# Patient Record
Sex: FEMALE | URBAN - METROPOLITAN AREA
[De-identification: names, ages, dates, MRNs, and addresses within clinical notes are randomized per-mention and may not be internally consistent; named-entity substitution may affect disease eponyms.]

---

## 2017-08-22 PROBLEM — Z12.11 SCREENING FOR COLON CANCER: Status: ACTIVE | Noted: 2017-08-22

## 2021-12-01 ENCOUNTER — MED REC SCAN ONLY (OUTPATIENT)
Dept: ORTHOPEDICS CLINIC | Facility: CLINIC | Age: 66
End: 2021-12-01

## 2022-03-21 ENCOUNTER — TELEPHONE (OUTPATIENT)
Dept: ORTHOPEDICS CLINIC | Facility: CLINIC | Age: 67
End: 2022-03-21

## 2022-03-21 NOTE — TELEPHONE ENCOUNTER
Patient is coming in for left ankle pain. Patient had imaging done,patient will bring imaging from external facility ,if further imaging is needed please place Rx .   Future Appointments   Date Time Provider Aakash Ware   4/13/2022  1:40 PM Xi Solorzano MD [facility]

## 2022-04-13 ENCOUNTER — OFFICE VISIT (OUTPATIENT)
Dept: ORTHOPEDICS CLINIC | Facility: CLINIC | Age: 67
End: 2022-04-13
Payer: COMMERCIAL

## 2022-04-13 VITALS — BODY MASS INDEX: 29.44 KG/M2 | WEIGHT: 160 LBS | HEIGHT: 62 IN

## 2022-04-13 DIAGNOSIS — M65.9 TENOSYNOVITIS OF TIBIALIS POSTERIOR TENDON: ICD-10-CM

## 2022-04-13 DIAGNOSIS — M25.572 ACUTE LEFT ANKLE PAIN: Primary | ICD-10-CM

## 2022-04-13 DIAGNOSIS — M65.9 TENOSYNOVITIS OF LEFT FOOT: ICD-10-CM

## 2022-04-13 PROCEDURE — 3008F BODY MASS INDEX DOCD: CPT | Performed by: ORTHOPAEDIC SURGERY

## 2022-04-13 PROCEDURE — 20551 NJX 1 TENDON ORIGIN/INSJ: CPT | Performed by: ORTHOPAEDIC SURGERY

## 2022-04-13 PROCEDURE — 99213 OFFICE O/P EST LOW 20 MIN: CPT | Performed by: ORTHOPAEDIC SURGERY

## 2022-04-13 RX ORDER — TRIAMCINOLONE ACETONIDE 40 MG/ML
20 INJECTION, SUSPENSION INTRA-ARTICULAR; INTRAMUSCULAR ONCE
Status: COMPLETED | OUTPATIENT
Start: 2022-04-13 | End: 2022-04-13

## 2022-04-13 RX ORDER — ESTRADIOL 0.05 MG/D
1 FILM, EXTENDED RELEASE TRANSDERMAL
COMMUNITY
Start: 2021-09-01

## 2022-04-13 RX ORDER — PROGESTERONE 100 MG/1
CAPSULE ORAL
COMMUNITY
Start: 2022-04-09

## 2022-04-13 RX ORDER — LEVOTHYROXINE SODIUM 0.1 MG/1
1 TABLET ORAL EVERY MORNING
COMMUNITY
Start: 2021-07-19

## 2022-04-13 RX ORDER — PYRIDOXINE HCL (VITAMIN B6) 100 MG
100 TABLET ORAL DAILY
COMMUNITY

## 2022-04-13 RX ORDER — ZINC GLUCONATE 50 MG
TABLET ORAL AS DIRECTED
COMMUNITY

## 2022-04-13 RX ADMIN — TRIAMCINOLONE ACETONIDE 20 MG: 40 INJECTION, SUSPENSION INTRA-ARTICULAR; INTRAMUSCULAR at 14:26:00

## 2022-04-13 NOTE — PROGRESS NOTES
Patient is 62-year-old white female with persistent left foot and ankle pain. This is been going on for about 7 8 months. She is seen another doctor who had suggested surgery and she was uncomfortable with this and presents to our office today for evaluation. Patient states it first began when she was using a spade and digging in her garden and she felt a sharp pain on the plantar aspect of her foot. She had been treated with oral medications including prednisone which did not help her and then she had nonweightbearing and in crutches for 6 weeks which did not help and then she has been in a boot and other devices to try to alleviate her symptoms. Patient comes in today looking for help and direction on her treatment. Patient denies any problem with her foot prior to the onset of this. Patient states the pain is predominately on the plantar aspect of her foot. Patient's exam shows her to have a mildly antalgic gait. Patient has tenderness in the midfoot region beneath the first talonavicular and naviculocuneiform joints. Patient also has some tenderness in the plantar soft tissues beneath these joints. No palpable deformities. Patient has intact inversion of the ankle and also intact flexion of the great toe and the lesser toe. Patient sensation is intact in the lower extremities. Motor exam is grossly 5/5. X-rays are reviewed as well as an MRI scan of her ankle and these show her to have some inflammatory changes along the medial aspect of the ankle but also in the plantar aspect of the foot. These are from back last year. Plain x-rays show arch to be well-maintained. Minimal degenerative changes noted. Impression is that of tenosynovitis of the flexor tendons to the greater and lesser toes of the left foot. Second impression is that of synovitis of the naviculocuneiform joint. Recommendations are to go ahead with a cortisone injection which was done into the navicular cuneiform joint. Patient had some increased pain at the initial injection. This resolved without problems after a few minutes. Patient's gait after that was slightly improved but still having some pain. In light of this I recommended that we go ahead with a MRI scan of her foot which is where her pathology seems to be focused. We will reevaluate after that is completed. Might consider an injection in the tendon sheath of the flexor tendons to the toes. This might be an issue with the irritation of the master knot of [place].

## 2022-04-18 ENCOUNTER — MED REC SCAN ONLY (OUTPATIENT)
Dept: ORTHOPEDICS CLINIC | Facility: CLINIC | Age: 67
End: 2022-04-18

## 2022-04-25 ENCOUNTER — TELEPHONE (OUTPATIENT)
Dept: ORTHOPEDICS CLINIC | Facility: CLINIC | Age: 67
End: 2022-04-25

## 2022-04-25 NOTE — TELEPHONE ENCOUNTER
Patient called and has been waiting for Dr. Donnie Walton to call and go over MRI results.  Please advise

## 2022-04-25 NOTE — TELEPHONE ENCOUNTER
MRI results: No imaging in epic. LM for pt to call back and schedule an appt to review MRI results with Dr Angelic Tovar and to let us know where MRI was completed. Pt is to bring MRI disc to her appt. No future appointments.

## 2022-05-11 ENCOUNTER — OFFICE VISIT (OUTPATIENT)
Dept: ORTHOPEDICS CLINIC | Facility: CLINIC | Age: 67
End: 2022-05-11
Payer: COMMERCIAL

## 2022-05-11 VITALS — HEIGHT: 62 IN | BODY MASS INDEX: 30.91 KG/M2 | WEIGHT: 168 LBS

## 2022-05-11 DIAGNOSIS — M65.9 TENOSYNOVITIS OF LEFT FOOT: Primary | ICD-10-CM

## 2022-05-11 PROCEDURE — 3008F BODY MASS INDEX DOCD: CPT | Performed by: ORTHOPAEDIC SURGERY

## 2022-05-11 PROCEDURE — 99212 OFFICE O/P EST SF 10 MIN: CPT | Performed by: ORTHOPAEDIC SURGERY

## 2022-05-21 NOTE — PROGRESS NOTES
Patient is a 59-year-old white female here for follow-up on her left ankle. Patient had the injection of her left foot and the navicular cuneiform joint which did give her significant relief of pain. Patient states that she is doing much better than she was when she came in last visit. The patient did have an MRI scan ordered of her left foot due to the fact that she was still having quite a bit of pain when she left after her last visit. I did review the MRI with her and it shows her to have pretty much a fairly healthy foot. Patient's exam shows that have a nonantalgic gait. No tenderness over the left foot midfoot region. Range of motion of the ankle and the toes is intact. Motor exam grossly 5/5. Impression is that of resolving tenosynovitis and synovitis of the left foot. Recommendations are for her to continue to wear good shoes with good arch supports. Follow-up will be as needed at this time. If symptoms return we can always try repeating the cortisone injection.

## 2023-02-01 ENCOUNTER — HOSPITAL ENCOUNTER (OUTPATIENT)
Dept: GENERAL RADIOLOGY | Age: 68
Discharge: HOME | End: 2023-02-01
Attending: ORTHOPAEDIC SURGERY
Payer: MEDICARE

## 2023-02-01 ENCOUNTER — OFFICE VISIT (OUTPATIENT)
Dept: ORTHOPEDICS CLINIC | Facility: CLINIC | Age: 68
End: 2023-02-01
Payer: MEDICARE

## 2023-02-01 VITALS — BODY MASS INDEX: 30.91 KG/M2 | HEIGHT: 62 IN | WEIGHT: 168 LBS

## 2023-02-01 DIAGNOSIS — G89.29 PAIN, FOOT, CHRONIC, LEFT: Primary | ICD-10-CM

## 2023-02-01 DIAGNOSIS — G89.29 PAIN, FOOT, CHRONIC, RIGHT: ICD-10-CM

## 2023-02-01 DIAGNOSIS — M79.671 PAIN, FOOT, CHRONIC, RIGHT: ICD-10-CM

## 2023-02-01 DIAGNOSIS — M79.673 PAIN OF FOOT, UNSPECIFIED LATERALITY: ICD-10-CM

## 2023-02-01 DIAGNOSIS — M79.672 PAIN, FOOT, CHRONIC, LEFT: Primary | ICD-10-CM

## 2023-02-01 DIAGNOSIS — M79.673 CHRONIC FOOT PAIN: ICD-10-CM

## 2023-02-01 DIAGNOSIS — M76.822 POSTERIOR TIBIAL TENDON DYSFUNCTION (PTTD) OF LEFT LOWER EXTREMITY: ICD-10-CM

## 2023-02-01 DIAGNOSIS — G89.29 CHRONIC FOOT PAIN: ICD-10-CM

## 2023-02-01 PROCEDURE — 99214 OFFICE O/P EST MOD 30 MIN: CPT | Performed by: ORTHOPAEDIC SURGERY

## 2023-02-01 PROCEDURE — 73630 X-RAY EXAM OF FOOT: CPT | Performed by: ORTHOPAEDIC SURGERY

## 2023-02-01 RX ORDER — LEVOTHYROXINE SODIUM 100 UG/1
CAPSULE ORAL
COMMUNITY
Start: 2023-01-29

## 2023-02-01 NOTE — PROGRESS NOTES
Patient is a 26-year-old white female who have seen in the past for problems with her left ankle and foot is here for follow-up because of persistent pain. Patient states that she actually has the pain of both feet. Patient does note that it is better than it was in the past but that is because she is doing very little activity. Patient states that if she increases her walking the pain returns. Previous MRI scan showed very little pathology to account for the pain of the left foot. Patient's exam shows that have a nonantalgic gait. Exam of her feet shows her to have tenderness along the medial border of the foot at the level of the navicular and cuneiform. This is of both feet. No palpable deformities. Patient has active inversion of the feet bilaterally. The patient's arches are fairly well-maintained with weightbearing. Sensory exams intact light touch lower extremities bilaterally. Dorsalis pedis pulses intact. X-rays of the feet show her to have minimal degenerative changes of the foot. Minimal degenerative changes in the midfoot. No significant osteophytes. Patient does have retained hardware of the right foot first and second rays as well as a small cerclage wire of the left foot in the first distal phalanx. All joints however have minimal to no arthritis. Impression is that of possible posterior tibialis tendon wearing and tendinitis of the feet bilaterally. 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 In light of the absence of x-ray findings I advise going ahead with an MRI scan of the left foot which is the most symptomatic foot. We will follow-up with her after that is completed. Patient has had improvement from previous injections into the navicular cuneiform joint. Would consider doing that again.

## 2023-02-10 ENCOUNTER — HOSPITAL ENCOUNTER (OUTPATIENT)
Dept: MRI IMAGING | Age: 68
Discharge: HOME | End: 2023-02-10
Attending: ORTHOPAEDIC SURGERY
Payer: MEDICARE

## 2023-02-10 DIAGNOSIS — G89.29 PAIN, FOOT, CHRONIC, LEFT: ICD-10-CM

## 2023-02-10 DIAGNOSIS — M79.672 PAIN, FOOT, CHRONIC, LEFT: ICD-10-CM

## 2023-02-10 DIAGNOSIS — M76.822 POSTERIOR TIBIAL TENDON DYSFUNCTION (PTTD) OF LEFT LOWER EXTREMITY: ICD-10-CM

## 2023-02-10 PROCEDURE — 73718 MRI LOWER EXTREMITY W/O DYE: CPT | Performed by: ORTHOPAEDIC SURGERY

## 2023-02-22 ENCOUNTER — OFFICE VISIT (OUTPATIENT)
Dept: ORTHOPEDICS CLINIC | Facility: CLINIC | Age: 68
End: 2023-02-22
Payer: MEDICARE

## 2023-02-22 VITALS — WEIGHT: 168 LBS | HEIGHT: 62 IN | BODY MASS INDEX: 30.91 KG/M2

## 2023-02-22 DIAGNOSIS — M25.572 ACUTE LEFT ANKLE PAIN: ICD-10-CM

## 2023-02-22 DIAGNOSIS — M65.9 TENOSYNOVITIS OF LEFT FOOT: Primary | ICD-10-CM

## 2023-02-22 RX ORDER — MEDROXYPROGESTERONE ACETATE 10 MG/1
10 TABLET ORAL DAILY
COMMUNITY
Start: 2023-02-14

## 2023-02-22 RX ORDER — ESTRADIOL 0.04 MG/D
FILM, EXTENDED RELEASE TRANSDERMAL
COMMUNITY
Start: 2023-02-14

## 2023-02-23 RX ORDER — TRIAMCINOLONE ACETONIDE 40 MG/ML
20 INJECTION, SUSPENSION INTRA-ARTICULAR; INTRAMUSCULAR ONCE
Status: SHIPPED | OUTPATIENT
Start: 2023-02-23

## 2023-02-23 NOTE — PROGRESS NOTES
Patient is a 63-year-old female here for follow-up on her left ankle. Patient has had an MRI scan performed recently which shows her to have some inflammatory changes around the posterior tibialis tendon. He she also has some changes around the peroneal tendons with a split peroneus brevis tendon. Patient's complaints however almost entirely on the medial aspect of the ankle and have an associated swelling. Patient has had previous injections of her right ankle which have helped her substantially in the past.  No recent trauma noted. Patient's exam shows have mild to moderate swelling to the medial aspect of the left ankle. She has tenderness over the posterior tibialis tendon at the medial malleolus. She has good strength of inversion and eversion of the ankle. No tenderness over the plantar fascia. No tenderness laterally over the peroneal tendons. Minimal effusion of the ankle joint itself. Neurologically intact light touch both feet. Dorsalis pedis pulses intact. Impression is that of posterior tibialis tenosynovitis of the left ankle. Recommendation is to go ahead with a cortisone injection in the tendon sheath just behind the medial malleolus. This was done with 1 cc of Xylocaine and Marcaine and 20 mg of Kenalog. Patient tolerated the injection well. We will have her follow-up with me in a month or 2. If this alleviates her symptoms no other treatments necessary.

## 2023-07-31 ENCOUNTER — TELEPHONE (OUTPATIENT)
Dept: ORTHOPEDICS CLINIC | Facility: CLINIC | Age: 68
End: 2023-07-31

## 2023-07-31 DIAGNOSIS — M25.512 LEFT SHOULDER PAIN, UNSPECIFIED CHRONICITY: Primary | ICD-10-CM

## 2023-07-31 NOTE — TELEPHONE ENCOUNTER
Would you be ok to see pt for RTC injury or would you prefer he be seen with someone else? You had previously seen her for ankle pain.

## 2023-07-31 NOTE — TELEPHONE ENCOUNTER
Previous patient of Orquidea Garcia wants to be seen for a new issue due to a Left shoulder RC injury. Please advise If okay to schedule or referred out to other providers.

## 2023-08-02 NOTE — TELEPHONE ENCOUNTER
Okay to schedule with DR Donnie Walton, per Davi Santana. Xray ordered. Please schedule with pt. Thank you!

## 2023-08-02 NOTE — TELEPHONE ENCOUNTER
Done.        Future Appointments   Date Time Provider Aakash Jeanie   8/11/2023 10:30 AM [facility] [place]   8/11/2023 10:40 AM Stefanie Smiley MD [facility]

## 2023-08-11 ENCOUNTER — HOSPITAL ENCOUNTER (OUTPATIENT)
Dept: GENERAL RADIOLOGY | Age: 68
Discharge: HOME | End: 2023-08-11
Attending: ORTHOPAEDIC SURGERY
Payer: MEDICARE

## 2023-08-11 ENCOUNTER — OFFICE VISIT (OUTPATIENT)
Dept: ORTHOPEDICS CLINIC | Facility: CLINIC | Age: 68
End: 2023-08-11
Payer: MEDICARE

## 2023-08-11 VITALS — HEIGHT: 62 IN | WEIGHT: 163 LBS | BODY MASS INDEX: 30 KG/M2

## 2023-08-11 DIAGNOSIS — M25.512 LEFT SHOULDER PAIN, UNSPECIFIED CHRONICITY: ICD-10-CM

## 2023-08-11 DIAGNOSIS — S46.012A TRAUMATIC COMPLETE TEAR OF LEFT ROTATOR CUFF, INITIAL ENCOUNTER: Primary | ICD-10-CM

## 2023-08-11 PROCEDURE — 73030 X-RAY EXAM OF SHOULDER: CPT | Performed by: ORTHOPAEDIC SURGERY

## 2023-08-11 PROCEDURE — 99213 OFFICE O/P EST LOW 20 MIN: CPT | Performed by: ORTHOPAEDIC SURGERY

## 2023-08-11 RX ORDER — DIAZEPAM 5 MG/1
5 TABLET ORAL SEE ADMIN INSTRUCTIONS
Qty: 3 TABLET | Refills: 0 | Status: SHIPPED | OUTPATIENT
Start: 2023-08-11

## 2023-08-11 NOTE — PROGRESS NOTES
Patient is a 73-year-old white female here for evaluation of her left shoulder. Patient tripped on a box 2 to 3 weeks ago and injured her left shoulder when she grabbed for support and had a twisting injury to her left shoulder. Subsequent to this the patient had persistent pain and also weakness of the shoulder. Patient states she has had a similar problem in the past with her right shoulder for which we had performed a rotator cuff repair. Patient states that she is concerned that she may have torn the rotator cuff of the left shoulder. Patient's exam shows have some weakness of abduction left shoulder. She has some mild pain with resisted abduction left shoulder. She has some guarding with elevation of the arm past 110 degrees. Passively I can bring her up to about 140 degrees with pain. Neurologically she is intact left upper extremity light touch. Handgrip strength grossly 5/5. Radial pulse 2/3. X-rays of her shoulder within normal limits. Well-maintained subacromial space. No significant degenerative changes noted. Impression is that of rotator cuff tear of the left shoulder. Recommendation is to go ahead and have her get an MRI scan due to the trauma and the similarity to her right shoulder problem. Also due to the weakness of abduction of the shoulder. Follow-up with me after that is completed. I will give her Valium 5 mg tablets to be taken at the time of the MRI scan if needed. I advised having her take 1 tablet an hour before hand and then repeat at the time of the procedure if needed. Third tablet could be taken if during the procedure she needed an additional support.

## 2023-08-28 ENCOUNTER — HOSPITAL ENCOUNTER (OUTPATIENT)
Dept: MRI IMAGING | Age: 68
Discharge: HOME | End: 2023-08-28
Attending: ORTHOPAEDIC SURGERY
Payer: MEDICARE

## 2023-08-28 DIAGNOSIS — S46.012A TRAUMATIC COMPLETE TEAR OF LEFT ROTATOR CUFF, INITIAL ENCOUNTER: ICD-10-CM

## 2023-08-28 PROCEDURE — 73221 MRI JOINT UPR EXTREM W/O DYE: CPT | Performed by: ORTHOPAEDIC SURGERY

## 2023-09-01 ENCOUNTER — TELEPHONE (OUTPATIENT)
Dept: ORTHOPEDICS CLINIC | Facility: CLINIC | Age: 68
End: 2023-09-01

## 2023-09-01 ENCOUNTER — OFFICE VISIT (OUTPATIENT)
Dept: ORTHOPEDICS CLINIC | Facility: CLINIC | Age: 68
End: 2023-09-01
Payer: MEDICARE

## 2023-09-01 VITALS — BODY MASS INDEX: 29.44 KG/M2 | HEIGHT: 62 IN | WEIGHT: 160 LBS

## 2023-09-01 VITALS — HEIGHT: 62 IN | WEIGHT: 160 LBS | BODY MASS INDEX: 29.44 KG/M2

## 2023-09-01 DIAGNOSIS — S46.012A TRAUMATIC COMPLETE TEAR OF LEFT ROTATOR CUFF, INITIAL ENCOUNTER: Primary | ICD-10-CM

## 2023-09-01 DIAGNOSIS — M75.22 BICEPS TENDINITIS OF LEFT UPPER EXTREMITY: ICD-10-CM

## 2023-09-01 DIAGNOSIS — S46.812A FULL THICKNESS TEAR OF LEFT SUBSCAPULARIS TENDON, INITIAL ENCOUNTER: ICD-10-CM

## 2023-09-01 DIAGNOSIS — M75.42 SUBACROMIAL IMPINGEMENT OF LEFT SHOULDER: ICD-10-CM

## 2023-09-01 DIAGNOSIS — S46.012D TRAUMATIC COMPLETE TEAR OF LEFT ROTATOR CUFF, SUBSEQUENT ENCOUNTER: Primary | ICD-10-CM

## 2023-09-01 PROCEDURE — 99213 OFFICE O/P EST LOW 20 MIN: CPT | Performed by: ORTHOPAEDIC SURGERY

## 2023-09-01 RX ORDER — PROGESTERONE 100 MG/1
200 CAPSULE ORAL NIGHTLY
COMMUNITY
Start: 2023-03-08

## 2023-09-05 RX ORDER — SCOLOPAMINE TRANSDERMAL SYSTEM 1 MG/1
1 PATCH, EXTENDED RELEASE TRANSDERMAL
Qty: 5 PATCH | Refills: 0 | Status: SHIPPED | OUTPATIENT
Start: 2023-09-05

## 2023-09-13 ENCOUNTER — MED REC SCAN ONLY (OUTPATIENT)
Dept: ORTHOPEDICS CLINIC | Facility: CLINIC | Age: 68
End: 2023-09-13

## 2023-09-20 ENCOUNTER — OFFICE VISIT (OUTPATIENT)
Dept: ORTHOPEDICS CLINIC | Facility: CLINIC | Age: 68
End: 2023-09-20
Payer: MEDICARE

## 2023-09-20 DIAGNOSIS — Z98.890 S/P ARTHROSCOPY OF SHOULDER: Primary | ICD-10-CM

## 2023-09-20 PROCEDURE — 99024 POSTOP FOLLOW-UP VISIT: CPT | Performed by: PHYSICIAN ASSISTANT

## 2023-10-17 ENCOUNTER — MED REC SCAN ONLY (OUTPATIENT)
Dept: ORTHOPEDICS CLINIC | Facility: CLINIC | Age: 68
End: 2023-10-17

## 2023-10-18 ENCOUNTER — OFFICE VISIT (OUTPATIENT)
Dept: ORTHOPEDICS CLINIC | Facility: CLINIC | Age: 68
End: 2023-10-18
Payer: MEDICARE

## 2023-10-18 DIAGNOSIS — Z98.890 S/P ARTHROSCOPY OF SHOULDER: Primary | ICD-10-CM

## 2023-10-18 PROCEDURE — 99024 POSTOP FOLLOW-UP VISIT: CPT | Performed by: PHYSICIAN ASSISTANT

## 2023-12-01 ENCOUNTER — MED REC SCAN ONLY (OUTPATIENT)
Dept: ORTHOPEDICS CLINIC | Facility: CLINIC | Age: 68
End: 2023-12-01

## 2023-12-22 ENCOUNTER — OFFICE VISIT (OUTPATIENT)
Dept: ORTHOPEDICS CLINIC | Facility: CLINIC | Age: 68
End: 2023-12-22
Payer: MEDICARE

## 2023-12-22 DIAGNOSIS — Z98.890 S/P ARTHROSCOPY OF SHOULDER: Primary | ICD-10-CM

## 2024-01-01 ENCOUNTER — MED REC SCAN ONLY (OUTPATIENT)
Dept: ORTHOPEDICS CLINIC | Facility: CLINIC | Age: 69
End: 2024-01-01

## 2024-01-02 ENCOUNTER — MED REC SCAN ONLY (OUTPATIENT)
Dept: ORTHOPEDICS CLINIC | Facility: CLINIC | Age: 69
End: 2024-01-02

## 2024-03-05 ENCOUNTER — MED REC SCAN ONLY (OUTPATIENT)
Dept: ORTHOPEDICS CLINIC | Facility: CLINIC | Age: 69
End: 2024-03-05

## 2024-04-19 ENCOUNTER — MED REC SCAN ONLY (OUTPATIENT)
Dept: ORTHOPEDICS CLINIC | Facility: CLINIC | Age: 69
End: 2024-04-19

## 2024-05-22 ENCOUNTER — MED REC SCAN ONLY (OUTPATIENT)
Dept: ORTHOPEDICS CLINIC | Facility: CLINIC | Age: 69
End: 2024-05-22

## 2024-09-24 ENCOUNTER — MED REC SCAN ONLY (OUTPATIENT)
Facility: CLINIC | Age: 69
End: 2024-09-24

## 2025-08-22 ENCOUNTER — TELEPHONE (OUTPATIENT)
Dept: ORTHOPEDICS CLINIC | Facility: CLINIC | Age: 70
End: 2025-08-22

## 2025-08-22 DIAGNOSIS — M25.511 RIGHT SHOULDER PAIN, UNSPECIFIED CHRONICITY: Primary | ICD-10-CM

## 2025-08-27 ENCOUNTER — HOSPITAL ENCOUNTER (OUTPATIENT)
Dept: GENERAL RADIOLOGY | Age: 70
Discharge: HOME | End: 2025-08-27
Attending: ORTHOPAEDIC SURGERY

## 2025-08-27 ENCOUNTER — OFFICE VISIT (OUTPATIENT)
Dept: ORTHOPEDICS CLINIC | Facility: CLINIC | Age: 70
End: 2025-08-27

## 2025-08-27 VITALS — HEIGHT: 61.2 IN | WEIGHT: 158 LBS | BODY MASS INDEX: 29.83 KG/M2

## 2025-08-27 DIAGNOSIS — M54.12 CERVICAL RADICULAR PAIN: ICD-10-CM

## 2025-08-27 DIAGNOSIS — M25.511 RIGHT SHOULDER PAIN, UNSPECIFIED CHRONICITY: ICD-10-CM

## 2025-08-27 DIAGNOSIS — M54.12 CERVICAL RADICULAR PAIN: Primary | ICD-10-CM

## 2025-08-27 PROCEDURE — 99214 OFFICE O/P EST MOD 30 MIN: CPT | Performed by: ORTHOPAEDIC SURGERY

## 2025-08-27 PROCEDURE — 72052 X-RAY EXAM NECK SPINE 6/>VWS: CPT | Performed by: ORTHOPAEDIC SURGERY

## 2025-08-27 PROCEDURE — 73030 X-RAY EXAM OF SHOULDER: CPT | Performed by: ORTHOPAEDIC SURGERY

## 2025-08-27 RX ORDER — METHYLPREDNISOLONE 4 MG/1
TABLET ORAL
Qty: 1 EACH | Refills: 0 | Status: SHIPPED | OUTPATIENT
Start: 2025-08-27